# Patient Record
Sex: FEMALE | URBAN - METROPOLITAN AREA
[De-identification: names, ages, dates, MRNs, and addresses within clinical notes are randomized per-mention and may not be internally consistent; named-entity substitution may affect disease eponyms.]

---

## 2022-07-18 ENCOUNTER — OFFICE VISIT (OUTPATIENT)
Dept: URBAN - METROPOLITAN AREA CLINIC 30 | Facility: CLINIC | Age: 47
End: 2022-07-18
Payer: COMMERCIAL

## 2022-07-18 DIAGNOSIS — E11.3553 DIABETES MELLITUS TYPE 2 WITH STABLE PROLIFERATIVE RETINOPATHY, BILATERAL: Primary | ICD-10-CM

## 2022-07-18 DIAGNOSIS — H26.493 OTHER SECONDARY CATARACT, BILATERAL: ICD-10-CM

## 2022-07-18 DIAGNOSIS — H57.13 OCULAR PAIN, BILATERAL: ICD-10-CM

## 2022-07-18 DIAGNOSIS — H35.373 PUCKERING OF MACULA, BILATERAL: ICD-10-CM

## 2022-07-18 DIAGNOSIS — H16.223 KERATOCONJUNCTIVITIS SICCA, BILATERAL: ICD-10-CM

## 2022-07-18 PROCEDURE — 92134 CPTRZ OPH DX IMG PST SGM RTA: CPT | Performed by: STUDENT IN AN ORGANIZED HEALTH CARE EDUCATION/TRAINING PROGRAM

## 2022-07-18 PROCEDURE — 92004 COMPRE OPH EXAM NEW PT 1/>: CPT | Performed by: STUDENT IN AN ORGANIZED HEALTH CARE EDUCATION/TRAINING PROGRAM

## 2022-07-18 ASSESSMENT — KERATOMETRY
OS: 42.80
OD: 42.85

## 2022-07-18 ASSESSMENT — VISUAL ACUITY
OD: 20/100
OS: 20/200

## 2022-07-18 ASSESSMENT — INTRAOCULAR PRESSURE
OS: 18
OD: 18

## 2022-07-18 NOTE — IMPRESSION/PLAN
Impression: Ocular pain, bilateral: H57.13. Plan: No e/o ocular pathology, dryness may contribute.  Recommend art tears qid prn OU

## 2022-07-18 NOTE — IMPRESSION/PLAN
Impression: Puckering of macula, bilateral: H35.373. Plan: Contributes to reduced vision. Distorted fov contour. Pt ed to monitor vision and seek care with changes. 
Monitor

## 2022-07-18 NOTE — IMPRESSION/PLAN
Impression: Keratoconjunctivitis sicca, bilateral: B09.441. Plan: Dry eyes contribute to the patient's complaints. There is no evidence of permanent changes to the cornea. Explained condition does not have a cure, is a chronic condition and will need consistent artificial tears use for maintenance.  
Start art tears up to qid prn OU

## 2022-07-18 NOTE — IMPRESSION/PLAN
Impression: Other secondary cataract, bilateral: H26.493.
-- not primary cause of reduced vision Plan: Pt ed on haze effect on vision, discussed r/b of procedure. Patient understands changing glasses will not improve vision. Patient desires to proceed with capsulotomy.

## 2022-07-18 NOTE — IMPRESSION/PLAN
Impression: Diabetes mellitus Type 2 with stable proliferative retinopathy, bilateral: R26.4241. Plan: No active hemes visible OU
s/p PRP OU, contributes to reduced vision, attenuated vessels and ?fibrovascular membrane OS. Vision appears more greatly reduced vs retinal appearance, recommend retina eval next available.